# Patient Record
Sex: FEMALE | Race: WHITE | NOT HISPANIC OR LATINO | Employment: OTHER | URBAN - METROPOLITAN AREA
[De-identification: names, ages, dates, MRNs, and addresses within clinical notes are randomized per-mention and may not be internally consistent; named-entity substitution may affect disease eponyms.]

---

## 2018-12-04 ENCOUNTER — OFFICE VISIT (OUTPATIENT)
Dept: NEUROLOGY | Facility: CLINIC | Age: 58
End: 2018-12-04

## 2018-12-04 VITALS
DIASTOLIC BLOOD PRESSURE: 78 MMHG | HEART RATE: 61 BPM | HEIGHT: 65 IN | WEIGHT: 232 LBS | BODY MASS INDEX: 38.65 KG/M2 | OXYGEN SATURATION: 97 % | SYSTOLIC BLOOD PRESSURE: 108 MMHG

## 2018-12-04 DIAGNOSIS — H53.9 VISION DISTURBANCE: ICD-10-CM

## 2018-12-04 DIAGNOSIS — G43.119 INTRACTABLE MIGRAINE WITH AURA WITHOUT STATUS MIGRAINOSUS: Primary | ICD-10-CM

## 2018-12-04 DIAGNOSIS — G44.89 OTHER HEADACHE SYNDROME: ICD-10-CM

## 2018-12-04 PROCEDURE — 99204 OFFICE O/P NEW MOD 45 MIN: CPT | Performed by: NURSE PRACTITIONER

## 2018-12-04 RX ORDER — SIMVASTATIN 40 MG
1 TABLET ORAL DAILY
COMMUNITY
Start: 2018-11-20

## 2018-12-04 RX ORDER — TOPIRAMATE 100 MG/1
1 TABLET, FILM COATED ORAL DAILY
COMMUNITY
Start: 2018-10-25 | End: 2019-10-01 | Stop reason: SDUPTHER

## 2018-12-04 RX ORDER — BUTALBITAL, ACETAMINOPHEN AND CAFFEINE 50; 325; 40 MG/1; MG/1; MG/1
1 TABLET ORAL EVERY 6 HOURS PRN
Qty: 20 TABLET | Refills: 2 | Status: SHIPPED | OUTPATIENT
Start: 2018-12-04 | End: 2018-12-15

## 2018-12-04 RX ORDER — TRAZODONE HYDROCHLORIDE 150 MG/1
1 TABLET ORAL NIGHTLY
COMMUNITY
Start: 2018-11-20 | End: 2018-12-04

## 2018-12-04 RX ORDER — ASPIRIN 81 MG/1
81 TABLET ORAL DAILY
COMMUNITY

## 2018-12-04 RX ORDER — OMEPRAZOLE 20 MG/1
CAPSULE, DELAYED RELEASE ORAL
COMMUNITY
Start: 2018-09-21

## 2018-12-04 RX ORDER — LISINOPRIL 10 MG/1
1 TABLET ORAL DAILY
COMMUNITY
Start: 2018-09-13

## 2018-12-04 RX ORDER — TIZANIDINE 4 MG/1
1 TABLET ORAL 3 TIMES DAILY
COMMUNITY
Start: 2018-09-08

## 2018-12-04 RX ORDER — GABAPENTIN 100 MG/1
CAPSULE ORAL
Qty: 270 CAPSULE | Refills: 5 | Status: SHIPPED | OUTPATIENT
Start: 2018-12-04 | End: 2019-10-01 | Stop reason: SDUPTHER

## 2018-12-04 RX ORDER — GABAPENTIN 100 MG/1
100 CAPSULE ORAL 3 TIMES DAILY
Refills: 0 | COMMUNITY
Start: 2018-11-16 | End: 2018-12-04

## 2018-12-04 RX ORDER — PROPRANOLOL HYDROCHLORIDE 20 MG/1
1 TABLET ORAL DAILY
COMMUNITY
Start: 2018-10-03 | End: 2019-02-01

## 2018-12-04 RX ORDER — OLANZAPINE 7.5 MG/1
1 TABLET ORAL NIGHTLY
COMMUNITY
Start: 2018-11-16

## 2018-12-04 RX ORDER — FLUOXETINE HYDROCHLORIDE 20 MG/1
1 CAPSULE ORAL 3 TIMES DAILY
COMMUNITY
Start: 2018-11-16

## 2018-12-04 RX ORDER — MAGNESIUM OXIDE 400 MG/1
400 TABLET ORAL DAILY
COMMUNITY

## 2018-12-04 NOTE — PROGRESS NOTES
Subjective:     Patient ID: Trini Obrien is a 58 y.o. female.    CC:   Chief Complaint   Patient presents with   • Migraine       HPI:   History of Present Illness     Miss Obrien is a 58-year-old patient here today for new patient evaluation.  She reports a history of migraines lasting about 7-8 years now, possibly longer.  She has noticed an increase in her migraine intensity, frequency and duration past 7-8 months.  Recently she has been to the ER 4-5 times within one month.  She reports her headaches are described as a throbbing sensation typically of the left thigh, sometimes right eye.  She has no associated tearing or vision changes.  She does endorse nausea with headache, she also complains of photophobia and phonophobia.  She also reports occasional visual scotoma described as flashing lights.  She has been on Topamax 100 mg twice a day for about 2-3 months.  She was previously on lower dose for several years, she has not noticed much improvement in her headache with the increased dosage.  She does feel like she has identified triggers to her migraines including stress, weather changes, bright sunlight and under sleeping.  She had CT of her brain in November which was reportedly normal.  She does also complain of chronic sinus issues, she has an appointment with ENT next week.  She denies regular dizziness, tinnitus, speech changes or stroke symptoms    The following portions of the patient's history were reviewed and updated as appropriate: allergies, current medications, past family history, past medical history, past social history, past surgical history and problem list.    Past Medical History:   Diagnosis Date   • Anxiety    • Arthritis    • Bipolar disorder (CMS/HCC)    • Hypertension    • Migraine        Past Surgical History:   Procedure Laterality Date   • BACK SURGERY   and    • BILATERAL BREAST REDUCTION     •  SECTION     • HYSTERECTOMY     • KNEE SURGERY   and         Social History     Socioeconomic History   • Marital status:      Spouse name: Not on file   • Number of children: Not on file   • Years of education: Not on file   • Highest education level: Not on file   Social Needs   • Financial resource strain: Not on file   • Food insecurity - worry: Not on file   • Food insecurity - inability: Not on file   • Transportation needs - medical: Not on file   • Transportation needs - non-medical: Not on file   Occupational History   • Not on file   Tobacco Use   • Smoking status: Never Smoker   Substance and Sexual Activity   • Alcohol use: No     Frequency: Never   • Drug use: No   • Sexual activity: Not on file   Other Topics Concern   • Not on file   Social History Narrative   • Not on file       Family History   Problem Relation Age of Onset   • Diabetes Mother    • Hypertension Mother    • Heart disease Maternal Grandfather         Review of Systems   Constitutional: Positive for fatigue.   HENT: Positive for ear pain.    Eyes: Negative.    Respiratory: Negative.    Cardiovascular: Negative.    Gastrointestinal: Negative.    Endocrine: Negative.    Genitourinary: Negative.    Musculoskeletal: Positive for back pain and neck pain.   Skin: Negative.    Allergic/Immunologic: Negative.    Neurological: Positive for headaches. Negative for dizziness, tremors, seizures, syncope, facial asymmetry, speech difficulty, weakness, light-headedness and numbness.   Hematological: Negative.    Psychiatric/Behavioral: Positive for dysphoric mood.        Objective:    Neurologic Exam     Mental Status   Oriented to person, place, and time.   Attention: normal. Concentration: normal.   Speech: speech is normal   Level of consciousness: alert  Knowledge: consistent with education.   Able to read. Able to write. Normal comprehension.     Cranial Nerves   Cranial nerves II through XII intact.     CN III, IV, VI   Pupils are equal, round, and reactive to light.  Extraocular motions  are normal.     Motor Exam   Muscle bulk: normal  Overall muscle tone: normal  Right arm tone: normal  Left arm tone: normal  Right arm pronator drift: absent  Left arm pronator drift: absent  Right leg tone: normal  Left leg tone: normal    Strength   Strength 5/5 throughout.     Sensory Exam   Light touch normal.     Gait, Coordination, and Reflexes     Gait  Gait: normal    Coordination   Romberg: negative  Finger to nose coordination: normal  Heel to shin coordination: normal  Tandem walking coordination: normal    Tremor   Resting tremor: absent  Intention tremor: absent  Action tremor: absent    Reflexes   Reflexes 2+ except as noted.   Right Anguiano: absent  Left Anguiano: absent      Physical Exam   Constitutional: She is oriented to person, place, and time. She appears well-developed and well-nourished. No distress.   HENT:   Head: Normocephalic and atraumatic.   Eyes: EOM are normal. Pupils are equal, round, and reactive to light.   Neck: Normal range of motion. Neck supple.   Cardiovascular: Normal rate.   Pulmonary/Chest: Effort normal. No respiratory distress.   Neurological: She is alert and oriented to person, place, and time. She has normal strength. She has a normal Finger-Nose-Finger Test, a normal Heel to Shin Test, a normal Romberg Test and a normal Tandem Gait Test. Gait normal.   Skin: Skin is warm. Capillary refill takes less than 2 seconds.   Psychiatric: She has a normal mood and affect. Her speech is normal and behavior is normal. Judgment and thought content normal.   Vitals reviewed.      Assessment/Plan:       Trini was seen today for migraine.    Diagnoses and all orders for this visit:    Intractable migraine with aura without status migrainosus  -     gabapentin (NEURONTIN) 100 MG capsule; Take 2 PO TID    Vision disturbance  -     MRI Angiogram Head Without Contrast; Future  -     Ambulatory Referral to Ophthalmology    Other headache syndrome  -     MRI Angiogram Head Without  Contrast; Future  -     Ambulatory Referral to Ophthalmology    Other orders  -     butalbital-acetaminophen-caffeine (ESGIC) -40 MG per tablet; Take 1 tablet by mouth Every 6 (Six) Hours As Needed for Headache.    Ms. Obrein has a long-standing history of migraines, she actually saw Dr. Boston many years ago.  She's been on Topamax for quite some time, she recently had an increase in migraine frequency and intensity in the past 7-8 months.  She has some associated visual floaters, complaints of bright jagged lines at times which are consistent with scotoma.  She does have a family history of aneurysm, only to get an MRA of the brain to rule out aneurysm, recent CT scan read as normal.  We'll consider MRI of the brain after MRI is complete.  She is unable to take triptan's due to prior intolerance, I will give her prescription for butalbital to take as needed, she's instructed to use this sparingly.  She is also past due for an eye exam, referral has been placed to ophthalmology  She has not noticed a difference in her headaches with the increase in Topamax, she has however noticed some word finding difficulties and other cognitive side effects.  She has tried many medications in the past including amitriptyline.  I do not feel comfortable increasing her beta blocker given her blood pressure and heart rate today.  I am going to start her on low-dose gabapentin 100 mg twice a day for now for further migraine treatment  Reviewed medications, potential side effects and signs and symptoms to report. Discussed risk versus benefits of treatment plan with patient and/or family-including medications, labs and radiology that may be ordered. Addressed questions and concerns during visit. Patient and/or family verbalized understanding and agree with plan.  As part of this patient's treatment plan I am prescribing controlled substances. The patient has been made aware of appropriate use of such medications, including  potential risk of somnolence, limited ability to drive and/or work safely, and potential for dependence or overdose. It has also been made clear that these medications are for use by the patient only, without concomitant use of alcohol or other substances unless prescribed. Keep out of reach of children.  Tomás report has been reviewed. If this is going to be prescribed long term, [facility] [place] has also been read and signed by patient and myself.  During this visit the following were done:  Labs Reviewed []    Labs Ordered []    Radiology Reports Reviewed [x]    Radiology Ordered [x]    PCP Records Reviewed [x]    Referring Provider Records Reviewed []    ER Records Reviewed []    Hospital Records Reviewed []    History Obtained From Family []    Radiology Images Reviewed []    Other Reviewed []    Records Requested []      EMR Dragon/Transcription Disclaimer:  Much of this encounter note is an electronic transcription of spoken language to printed text. Electronic transcription of spoken language may permit erroneous words or phrases to be inadvertently transcribed. Although I have reviewed the note for such errors, some may still exist in this documentation.             Leanna Pal, APRN  12/10/2018

## 2019-01-04 ENCOUNTER — OFFICE VISIT (OUTPATIENT)
Dept: NEUROLOGY | Facility: CLINIC | Age: 59
End: 2019-01-04

## 2019-01-04 VITALS
WEIGHT: 233 LBS | BODY MASS INDEX: 38.82 KG/M2 | HEIGHT: 65 IN | HEART RATE: 74 BPM | OXYGEN SATURATION: 98 % | DIASTOLIC BLOOD PRESSURE: 86 MMHG | SYSTOLIC BLOOD PRESSURE: 158 MMHG

## 2019-01-04 DIAGNOSIS — I66.21 OCCLUSION AND STENOSIS OF RIGHT POSTERIOR CEREBRAL ARTERY: Primary | ICD-10-CM

## 2019-01-04 DIAGNOSIS — J34.9 SINUS DISEASE: ICD-10-CM

## 2019-01-04 DIAGNOSIS — G43.709 CHRONIC MIGRAINE WITHOUT AURA WITHOUT STATUS MIGRAINOSUS, NOT INTRACTABLE: ICD-10-CM

## 2019-01-04 PROCEDURE — 99213 OFFICE O/P EST LOW 20 MIN: CPT | Performed by: NURSE PRACTITIONER

## 2019-01-04 NOTE — PROGRESS NOTES
Subjective:     Patient ID: Trini Obrien is a 58 y.o. female.    CC:   Chief Complaint   Patient presents with   • Migraine       HPI:   History of Present Illness     Ms Obrien is here today for follow up on headaches.  When I first saw her she reported a history of migraines for about 7-8 years now, possibly longer.  She had noticed an increase in her migraine intensity, frequency and duration past 7-8 months.  Recently she had been to the ER 4-5 times within one month.  She reported her headaches were described as a throbbing sensation typically of the left thigh, sometimes right eye.  She reported no associated tearing or vision changes.  She does endorse nausea with headache, she also complains of photophobia and phonophobia.  She also reports occasional visual scotoma described as flashing lights.  She had been on Topamax 100 mg twice a day for about 2-3 months, she had also been on elavil and was unable to take triptans.  She was previously on lower dose for several years, she has not noticed much improvement in her headache with the increased dosage.  She does feel like she has identified triggers to her migraines including stress, weather changes, bright sunlight and under sleeping.  She had CT of her brain in November which was reportedly normal.  She does also complain of chronic sinus issues, she has an appointment with ENT next week.  She denies regular dizziness, tinnitus, speech changes or stroke symptoms    At first visit I started her on gabapentin 200 mg TID, she is taking this BID.  Headaches are a bit improved however since I saw her she did have sinus surgery.  She apparently had severe sinus disease, she is wondering if this surgery also helped her headaches.  She did have MRA in [facility], I had not received the report, while pt was here I did get report which shows occlusion right posterior cerebral artery.       The following portions of the patient's history were reviewed and updated as  appropriate: allergies, current medications, past family history, past medical history, past social history, past surgical history and problem list.    Past Medical History:   Diagnosis Date   • Anxiety    • Arthritis    • Bipolar disorder (CMS/HCC)    • Hypertension    • Migraine        Past Surgical History:   Procedure Laterality Date   • BACK SURGERY   and    • BILATERAL BREAST REDUCTION     •  SECTION     • HYSTERECTOMY     • KNEE SURGERY   and 2017       Social History     Socioeconomic History   • Marital status:      Spouse name: Not on file   • Number of children: Not on file   • Years of education: Not on file   • Highest education level: Not on file   Social Needs   • Financial resource strain: Not on file   • Food insecurity - worry: Not on file   • Food insecurity - inability: Not on file   • Transportation needs - medical: Not on file   • Transportation needs - non-medical: Not on file   Occupational History   • Not on file   Tobacco Use   • Smoking status: Never Smoker   Substance and Sexual Activity   • Alcohol use: No     Frequency: Never   • Drug use: No   • Sexual activity: Not on file   Other Topics Concern   • Not on file   Social History Narrative   • Not on file       Family History   Problem Relation Age of Onset   • Diabetes Mother    • Hypertension Mother    • Heart disease Maternal Grandfather         Review of Systems   Constitutional: Negative.    HENT:        Recent sinus surgery   Eyes: Negative.    Respiratory: Negative.    Cardiovascular: Negative.    Genitourinary: Negative.    Musculoskeletal: Negative.    Neurological: Positive for dizziness and headaches. Negative for tremors, seizures, syncope, facial asymmetry, speech difficulty, weakness and numbness.   Hematological: Negative.    Psychiatric/Behavioral: Negative.         Objective:    Neurologic Exam     Mental Status   Oriented to person, place, and time.   Attention: normal. Concentration: normal.    Speech: speech is normal   Level of consciousness: alert  Knowledge: consistent with education.   Able to read. Able to write. Normal comprehension.     Cranial Nerves   Cranial nerves II through XII intact.     CN III, IV, VI   Pupils are equal, round, and reactive to light.  Extraocular motions are normal.     Motor Exam   Muscle bulk: normal  Overall muscle tone: normal  Right arm tone: normal  Left arm tone: normal  Right arm pronator drift: absent  Left arm pronator drift: absent  Right leg tone: normal  Left leg tone: normal    Strength   Strength 5/5 throughout.     Sensory Exam   Light touch normal.     Gait, Coordination, and Reflexes     Gait  Gait: normal    Coordination   Romberg: negative  Finger to nose coordination: normal  Heel to shin coordination: normal  Tandem walking coordination: normal    Tremor   Resting tremor: absent  Intention tremor: absent  Action tremor: absent    Reflexes   Reflexes 2+ except as noted.   Right plantar: normal  Left plantar: normal  Right Anguiano: absent  Left Anguiano: absent      Physical Exam   Constitutional: She is oriented to person, place, and time. She appears well-developed and well-nourished. No distress.   HENT:   Head: Normocephalic and atraumatic.   Eyes: EOM are normal. Pupils are equal, round, and reactive to light.   Neck: Normal range of motion.   Cardiovascular: Normal rate.   Pulmonary/Chest: Effort normal.   Neurological: She is alert and oriented to person, place, and time. She has normal strength. She has a normal Finger-Nose-Finger Test, a normal Heel to Shin Test, a normal Romberg Test and a normal Tandem Gait Test. Gait normal.   Psychiatric: She has a normal mood and affect. Her speech is normal and behavior is normal. Judgment and thought content normal.   Vitals reviewed.      Assessment/Plan:       Trini was seen today for migraine.    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 Stafford for opinion  Orders:  -     Ambulatory Referral to Neurosurgery    Chronic migraine without aura without status migrainosus, not intractable  Comments:  improved with gabapentin and recent sinus surgery.  Orders:  -     MRI Brain Without Contrast; Future    Sinus disease  Comments:  s/p surgery 2 weeks ago    she is doing better with gabapentin and after sinus surgery.  I would like her to see Dr Stafford for opinion on MRA finding.  I would like to get MRI of brain, CT was negative to rule out tumor or white matter dx.  Reviewed medications, potential side effects and signs and symptoms to report. Discussed risk versus benefits of treatment plan with patient and/or family-including medications, labs and radiology that may be ordered. Addressed questions and concerns during visit. Patient and/or family verbalized understanding and agree with plan.  We reviewed possible headache triggers, stress relief techniques, and alternative treatments for headaches. The patient was in understanding and all questions were addressed. We reviewed the diagnosis and treatment plan and medication side effect profile. The patient will follow up as scheduled.  EMR Dragon/Transcription Disclaimer:  Much of this encounter note is an electronic transcription of spoken language to printed text. Electronic transcription of spoken language may permit erroneous words or phrases to be inadvertently transcribed. Although I have reviewed the note for such errors, some may still exist in this documentation.             Leanna Pal, APRN  1/9/2019

## 2019-01-08 ENCOUNTER — TELEPHONE (OUTPATIENT)
Dept: NEUROLOGY | Facility: CLINIC | Age: 59
End: 2019-01-08

## 2019-01-08 NOTE — TELEPHONE ENCOUNTER
I had seen this when she was here and referred her to neurosurgery, Dr. Stafford, for opinion. Can you check on appt, they may want that copy prior to scheduling.  She is going to bring disc with her to appt with Dr. Stafford

## 2019-01-23 ENCOUNTER — OFFICE VISIT (OUTPATIENT)
Dept: NEUROSURGERY | Facility: CLINIC | Age: 59
End: 2019-01-23

## 2019-01-23 VITALS
WEIGHT: 235 LBS | HEIGHT: 65 IN | BODY MASS INDEX: 39.15 KG/M2 | TEMPERATURE: 97.7 F | DIASTOLIC BLOOD PRESSURE: 92 MMHG | SYSTOLIC BLOOD PRESSURE: 150 MMHG

## 2019-01-23 DIAGNOSIS — E78.5 HYPERLIPIDEMIA, UNSPECIFIED HYPERLIPIDEMIA TYPE: ICD-10-CM

## 2019-01-23 DIAGNOSIS — R90.89 ABNORMAL MRA, BRAIN: Primary | ICD-10-CM

## 2019-01-23 DIAGNOSIS — I10 ESSENTIAL HYPERTENSION: ICD-10-CM

## 2019-01-23 PROCEDURE — 99204 OFFICE O/P NEW MOD 45 MIN: CPT | Performed by: PHYSICIAN ASSISTANT

## 2019-01-23 RX ORDER — TRAZODONE HYDROCHLORIDE 150 MG/1
TABLET ORAL
COMMUNITY

## 2019-01-23 RX ORDER — MELOXICAM 15 MG/1
1 TABLET ORAL DAILY
COMMUNITY

## 2019-01-23 NOTE — PROGRESS NOTES
Subjective   Patient: Trini Obrien  : 1960  Chart #: [id number]    Date of Service: 2019    Chief complaint: abnormal MR Angiogram    HPI:  Miss Obrien is a 58-year-old female with past medical history of hypertension, hyperlipidemia, SVT status post heart ablation, anxiety, migraine headaches, arthritis and bipolar disorder who presents to our clinic today as a new patient for evaluation of an abnormal MR angiogram.  The MR angiogram was ordered by her primary care provider, Fiorella GIL for workup of migraine headaches.  The MRA was significant for an opacified right maxillary sinus for which she has undergone surgery with subsequent resolution of her migraines.  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     The patient denies heart attack, previous stroke or TIA symptoms, and diabetes.  She stopped smoking cigarettes approximately 10 years ago.  She takes a daily baby aspirin.  She denies stroke like symptoms including unilateral weakness, numbness or paresthesia, changes in swallowing, vision or speech, and changes in balance and coordination.     Past Medical History:   Diagnosis Date   • Anxiety    • Arthritis    • Bipolar disorder (CMS/HCC)    • Hypertension    • Migraine        Allergies   Allergen Reactions   • Antivert [Meclizine] Mental Status Change   • Imitrex [Sumatriptan] Hives   • Penicillins Rash         Current Outpatient Medications:   •  aspirin 81 MG EC tablet, Take 81 mg by mouth Daily., Disp: , Rfl:   •  Cholecalciferol (VITAMIN D3) 5000 units capsule capsule, Take 5,000 Units by mouth Daily., Disp: , Rfl:   •  FLUoxetine (PROzac) 20 MG capsule, Take 1 capsule by mouth 3 (Three) Times a Day., Disp: , Rfl:   •  gabapentin (NEURONTIN) 100 MG capsule, Take 2 PO TID, Disp: 270 capsule, Rfl: 5  •  lisinopril (PRINIVIL,ZESTRIL) 10 MG tablet, Take 1 tablet by mouth Daily., Disp: , Rfl:   •  magnesium  oxide (MAG-OX) 400 MG tablet, Take 400 mg by mouth Daily., Disp: , Rfl:   •  OLANZapine (zyPREXA) 7.5 MG tablet, Take 1 tablet by mouth Every Night., Disp: , Rfl:   •  omeprazole (priLOSEC) 20 MG capsule, , Disp: , Rfl:   •  propranolol (INDERAL) 20 MG tablet, Take 1 tablet by mouth Daily., Disp: , Rfl:   •  simvastatin (ZOCOR) 40 MG tablet, Take 1 tablet by mouth Daily., Disp: , Rfl:   •  tiZANidine (ZANAFLEX) 4 MG tablet, Take 1 tablet by mouth 3 (Three) Times a Day., Disp: , Rfl:   •  topiramate (TOPAMAX) 100 MG tablet, Take 1 tablet by mouth 2 (Two) Times a Day., Disp: , Rfl:   •  meloxicam (MOBIC) 15 MG tablet, Take 1 tablet by mouth Daily., Disp: , Rfl:   •  traZODone (DESYREL) 150 MG tablet, trazodone 150 mg tablet, Disp: , Rfl:     Social History     Socioeconomic History   • Marital status:      Spouse name: Not on file   • Number of children: Not on file   • Years of education: Not on file   • Highest education level: Not on file   Tobacco Use   • Smoking status: Never Smoker   • Smokeless tobacco: Never Used   Substance and Sexual Activity   • Alcohol use: No     Frequency: Never   • Drug use: No   • Sexual activity: Defer       Family History   Problem Relation Age of Onset   • Diabetes Mother    • Hypertension Mother    • Heart disease Maternal Grandfather        Review of Systems   Constitutional: Positive for fatigue. Negative for activity change, appetite change, chills, diaphoresis, fever and unexpected weight change.   HENT: Negative for congestion, dental problem, drooling, ear discharge, ear pain, facial swelling, hearing loss, mouth sores, nosebleeds, postnasal drip, rhinorrhea, sinus pressure, sneezing, sore throat, tinnitus, trouble swallowing and voice change.    Eyes: Negative for photophobia, pain, discharge, redness, itching and visual disturbance.   Respiratory: Negative for apnea, cough, choking, chest tightness, shortness of breath, wheezing and stridor.    Cardiovascular:  "Negative for chest pain, palpitations and leg swelling.   Gastrointestinal: Negative for abdominal distention, abdominal pain, anal bleeding, blood in stool, constipation, diarrhea, nausea, rectal pain and vomiting.   Endocrine: Positive for cold intolerance. Negative for heat intolerance, polydipsia, polyphagia and polyuria.   Genitourinary: Negative for decreased urine volume, difficulty urinating, dysuria, enuresis, flank pain, frequency, genital sores, hematuria and urgency.   Musculoskeletal: Positive for arthralgias, back pain and neck pain. Negative for gait problem, joint swelling, myalgias and neck stiffness.   Skin: Negative for color change, pallor, rash and wound.   Allergic/Immunologic: Negative for environmental allergies, food allergies and immunocompromised state.   Neurological: Positive for headaches. Negative for dizziness, tremors, seizures, syncope, facial asymmetry, speech difficulty, weakness, light-headedness and numbness.   Hematological: Negative for adenopathy. Does not bruise/bleed easily.   Psychiatric/Behavioral: Positive for dysphoric mood. Negative for agitation, behavioral problems, confusion, hallucinations, self-injury, sleep disturbance and suicidal ideas. The patient is not nervous/anxious and is not hyperactive.          Physical examination:  Blood pressure 150/92, temperature 97.7 °F (36.5 °C), temperature source Temporal, height 165.1 cm (65\"), weight 107 kg (235 lb).  Physical Exam   Constitutional: She appears well-developed and well-nourished.   Cardiovascular: Normal rate, regular rhythm and normal heart sounds.   Pulmonary/Chest: Effort normal and breath sounds normal.       Neurological Exam    Mental Status  Oriented to person, place and time  Attention: normal  Level of consciousness: alert  Knowledge: adequate fund of knowledge    Speech  Speech: speech is normal without dysarthria or aphasia    Cranial Nerves  Cranial nerves II through XII intact    Motor " Exam  Strength 5/5 bilateral upper and lower extremities  Normal tone bilateral upper and lower extremities  No pronator drift of the upper extremities    Sensory Exam  Light touch normal    Gait, Coordination and Reflexes  Normal gait  Normal finger to nose coordination  Normal heel to shin  Reflexes unremarkable    No carotid bruits bilaterally      Radiographic Imaging:  MR Angiogram images dated 12/26/2018 were reviewed by and with Dr. Stafford along with the corresponding radiologic report. There were no prior images available for comparison. There was a questionable right distal PCA stenosis which could be artifactual.  He did not appreciate any proximal stenosis that would benefit from neuro intervention    Medical Decision Making:     This Weirton is a 58-year-old female with history of hypertension, hyperlipidemia, migraines headaches and previous tobacco dependence.  She presented to clinic today for interpretation and evaluation of abnormal MR angiogram performed in December 2018 which raised suspicion for right posterior cerebral artery and right middle cerebral artery stenoses.  The MRA was ordered during workup of migraine headaches, which subsequently resolved after sinus surgery.  She did not report any stroke-like symptoms.  Dr. Stafford reviewed her imaging and feels the PCA stenosis may be artifact and did not appreciate any proximal stenosis which would benefit from neuro intervention.  She is already modifying her cardiovascular risk factors by taking aspirin 81 mg daily, pravastatin for her hyperlipidemia and antihypertensives to control her blood pressure.  She no longer smokes.  At this time, Dr. Stafford does not feel neuro intervention is necessary and would recommend that she follow up on an as-needed basis.  She was instructed to call 911 immediately the onset of any strokelike symptoms including sudden unilateral weakness, paresthesias, changes in vision or speech or balance and  coordination.    I spent 45 minutes performing a face-to-face diagnostic evaluation, neurosurgical decision making to assess and support the plan of care, risks and benefits of treatment, options and coordination of care with the patient.    Cristina Dillon PA-C

## 2019-02-01 ENCOUNTER — OFFICE VISIT (OUTPATIENT)
Dept: NEUROLOGY | Facility: CLINIC | Age: 59
End: 2019-02-01

## 2019-02-01 VITALS
DIASTOLIC BLOOD PRESSURE: 82 MMHG | SYSTOLIC BLOOD PRESSURE: 138 MMHG | HEIGHT: 65 IN | WEIGHT: 234 LBS | BODY MASS INDEX: 38.99 KG/M2 | HEART RATE: 84 BPM | OXYGEN SATURATION: 97 %

## 2019-02-01 DIAGNOSIS — I10 ESSENTIAL HYPERTENSION: ICD-10-CM

## 2019-02-01 DIAGNOSIS — G43.709 CHRONIC MIGRAINE WITHOUT AURA WITHOUT STATUS MIGRAINOSUS, NOT INTRACTABLE: Primary | ICD-10-CM

## 2019-02-01 PROCEDURE — 99213 OFFICE O/P EST LOW 20 MIN: CPT | Performed by: NURSE PRACTITIONER

## 2019-02-01 RX ORDER — PROPRANOLOL HYDROCHLORIDE 20 MG/1
20 TABLET ORAL 3 TIMES DAILY
Qty: 90 TABLET | Refills: 3 | Status: CANCELLED | OUTPATIENT
Start: 2019-02-01

## 2019-02-01 NOTE — PROGRESS NOTES
Subjective:     Patient ID: Trini Obrien is a 58 y.o. female.    CC:   Chief Complaint   Patient presents with   • Migraine       HPI:   History of Present Illness     Ms Obrien is here today for follow up on headaches.  When I first saw her she reported a history of migraines for about 7-8 years now, possibly longer.  She had noticed an increase in her migraine intensity, frequency and duration past 7-8 months.  Recently she had been to the ER 4-5 times within one month.  She reported her headaches were described as a throbbing sensation typically of the left thigh, sometimes right eye.  She reported no associated tearing or vision changes.  She does endorse nausea with headache, she also complains of photophobia and phonophobia.  She also reports occasional visual scotoma described as flashing lights.  She had been on Topamax 100 mg twice a day for about 2-3 months, she had also been on elavil and was unable to take triptans.  She was previously on lower dose for several years, she has not noticed much improvement in her headache with the increased dosage.  She does feel like she has identified triggers to her migraines including stress, weather changes, bright sunlight and under sleeping.  She had CT of her brain in November which was reportedly normal.  She does also complain of chronic sinus issues, she has an appointment with ENT next week.  She denies regular dizziness, tinnitus, speech changes or stroke symptoms     At first visit I started her on gabapentin 200 mg TID, she is taking this BID.  Headaches are a bit improved however since I saw her she did have sinus surgery.  She apparently had severe sinus disease, she is wondering if this surgery also helped her headaches.  She did have MRA in [facility], I had not received the report, while pt was here I did get report which shows occlusion right posterior cerebral artery, she has since seen Dr. Stafford who felt this was nonsurgical.    Overall she is  doing well, she has had a few migraines since I saw her last but not as intense or frequent as prior to sinus surgery.  Her BP has been running high in 150-160s/70-90s.  She is taking propranolol 20 mg in am only.    The following portions of the patient's history were reviewed and updated as appropriate: allergies, current medications, past family history, past medical history, past social history, past surgical history and problem list.    Past Medical History:   Diagnosis Date   • Anxiety    • Arthritis    • Bipolar disorder (CMS/HCC)    • HLD (hyperlipidemia) 2019   • Hypertension    • Migraine        Past Surgical History:   Procedure Laterality Date   • BACK SURGERY   and    • BILATERAL BREAST REDUCTION     •  SECTION     • HYSTERECTOMY     • KNEE SURGERY   and        Social History     Socioeconomic History   • Marital status:      Spouse name: Not on file   • Number of children: Not on file   • Years of education: Not on file   • Highest education level: Not on file   Social Needs   • Financial resource strain: Not on file   • Food insecurity - worry: Not on file   • Food insecurity - inability: Not on file   • Transportation needs - medical: Not on file   • Transportation needs - non-medical: Not on file   Occupational History   • Not on file   Tobacco Use   • Smoking status: Never Smoker   • Smokeless tobacco: Never Used   Substance and Sexual Activity   • Alcohol use: No     Frequency: Never   • Drug use: No   • Sexual activity: Defer   Other Topics Concern   • Not on file   Social History Narrative   • Not on file       Family History   Problem Relation Age of Onset   • Diabetes Mother    • Hypertension Mother    • Heart disease Maternal Grandfather         Review of Systems   Constitutional: Negative.    Eyes: Negative.    Respiratory: Negative.    Cardiovascular: Negative.    Gastrointestinal: Negative.    Endocrine: Negative.    Genitourinary: Negative.     Musculoskeletal: Negative.    Allergic/Immunologic: Negative.    Neurological: Positive for headaches. Negative for dizziness, tremors, seizures, syncope, facial asymmetry, speech difficulty, weakness, light-headedness and numbness.   Hematological: Negative.    Psychiatric/Behavioral: Negative.         Objective:    Neurologic Exam     Mental Status   Oriented to person, place, and time.   Attention: normal. Concentration: normal.   Speech: speech is normal   Level of consciousness: alert  Knowledge: consistent with education.   Able to read. Able to write. Normal comprehension.     Cranial Nerves   Cranial nerves II through XII intact.     CN III, IV, VI   Pupils are equal, round, and reactive to light.  Extraocular motions are normal.     Motor Exam   Muscle bulk: normal  Overall muscle tone: normal    Strength   Strength 5/5 throughout.     Sensory Exam   Light touch normal.     Gait, Coordination, and Reflexes     Gait  Gait: normal    Coordination   Romberg: negative  Finger to nose coordination: normal    Tremor   Resting tremor: absent  Intention tremor: absent  Action tremor: absent    Reflexes   Reflexes 2+ except as noted.       Physical Exam   Constitutional: She is oriented to person, place, and time. She appears well-developed and well-nourished. No distress.   HENT:   Head: Normocephalic and atraumatic.   Eyes: EOM are normal. Pupils are equal, round, and reactive to light.   Cardiovascular: Normal rate.   Pulmonary/Chest: Effort normal.   Neurological: She is alert and oriented to person, place, and time. She has normal strength. She has a normal Finger-Nose-Finger Test and a normal Romberg Test. Gait normal.   Psychiatric: She has a normal mood and affect. Her speech is normal and behavior is normal. Judgment and thought content normal.   Vitals reviewed.      Assessment/Plan:       Trini was seen today for migraine.    Diagnoses and all orders for this visit:    Chronic migraine without aura  without status migrainosus, not intractable  Comments:  increase propranolol to 20 mg BID, may go to TID if needed    Essential hypertension  Comments:  increase propranolol    Other orders  -     propranolol (INDERAL) 20 MG tablet; Take 1 tablet by mouth 3 (Three) Times a Day.    Overall Mrs. Obrien is improved, she has had a few headaches since I saw her last where as they had improved after sinus surgery initially.  She is only taking a very small dose of propranolol 20 mg once daily, we've discussed that this is a very short acting medication and she would benefit more from a twice a day to 3 times a day dosing especially given her elevated blood pressures.  She would like to increase to twice a day for now, she may add an afternoon dose if needed.  If she does get up to a higher dose we can change this to an extended release form once daily.  She is to call me a report in a few weeks on her blood pressures and headaches and we can make medication changes over the phone.  She again declines MRI of the brain, she had a negative CT prior to seeing me.  MRA was performed showing some stenosis, she saw Dr. salinas who recommended no surgical intervention which she is very happy with.  I would  like to see her back in about 3-4 months or sooner if needed, she'll call me if she has any questions or concerns prior to follow-up appointment  Reviewed medications, potential side effects and signs and symptoms to report. Discussed risk versus benefits of treatment plan with patient and/or family-including medications, labs and radiology that may be ordered. Addressed questions and concerns during visit. Patient and/or family verbalized understanding and agree with plan.  EMR Dragon/Transcription Disclaimer:  Much of this encounter note is an electronic transcription of spoken language to printed text. Electronic transcription of spoken language may permit erroneous words or phrases to be inadvertently transcribed. Although  I have reviewed the note for such errors, some may still exist in this documentation.           Leanna Pal, APRN  2/6/2019

## 2019-02-04 ENCOUNTER — TELEPHONE (OUTPATIENT)
Dept: NEUROLOGY | Facility: CLINIC | Age: 59
End: 2019-02-04

## 2019-02-04 NOTE — TELEPHONE ENCOUNTER
Pt called and said Gwendolyn was suppose to send in a new script for an increase in her propranolol but pharmacy said they do not have it so I placed her on hold and called Gwendolyn and she said to tell pt to take two of the propranolol she has and she would send in the increase to pharmacy.  I let pt know response

## 2019-02-05 RX ORDER — PROPRANOLOL HYDROCHLORIDE 20 MG/1
20 TABLET ORAL 2 TIMES DAILY
Qty: 60 TABLET | Refills: 5 | Status: SHIPPED | OUTPATIENT
Start: 2019-02-05 | End: 2019-02-20

## 2019-02-19 ENCOUNTER — TELEPHONE (OUTPATIENT)
Dept: NEUROLOGY | Facility: CLINIC | Age: 59
End: 2019-02-19

## 2019-02-19 NOTE — TELEPHONE ENCOUNTER
Pt is calling to verify how she is supposed to take Propranolol.  She states that she was taking Propranolol 20 mg 2 po qam and 1 po qhs and she thought you were increasing to 2  po BID.    The new prescription was sent in for BID.

## 2019-02-20 RX ORDER — PROPRANOLOL HYDROCHLORIDE 20 MG/1
TABLET ORAL
Qty: 360 TABLET | Refills: 1 | Status: SHIPPED | OUTPATIENT
Start: 2019-02-20

## 2019-02-20 RX ORDER — PROPRANOLOL HYDROCHLORIDE 20 MG/1
TABLET ORAL
Qty: 180 TABLET | Refills: 1 | Status: SHIPPED | OUTPATIENT
Start: 2019-02-20 | End: 2019-02-20 | Stop reason: SDUPTHER

## 2019-03-27 ENCOUNTER — OFFICE VISIT (OUTPATIENT)
Dept: NEUROLOGY | Facility: CLINIC | Age: 59
End: 2019-03-27

## 2019-03-27 VITALS
HEIGHT: 65 IN | SYSTOLIC BLOOD PRESSURE: 138 MMHG | WEIGHT: 241 LBS | HEART RATE: 66 BPM | OXYGEN SATURATION: 96 % | DIASTOLIC BLOOD PRESSURE: 88 MMHG | BODY MASS INDEX: 40.15 KG/M2

## 2019-03-27 DIAGNOSIS — G43.709 CHRONIC MIGRAINE WITHOUT AURA WITHOUT STATUS MIGRAINOSUS, NOT INTRACTABLE: Primary | ICD-10-CM

## 2019-03-27 PROCEDURE — 99212 OFFICE O/P EST SF 10 MIN: CPT | Performed by: NURSE PRACTITIONER

## 2019-06-20 ENCOUNTER — TELEPHONE (OUTPATIENT)
Dept: NEUROLOGY | Facility: CLINIC | Age: 59
End: 2019-06-20

## 2019-06-20 NOTE — TELEPHONE ENCOUNTER
----- Message from Stella Flores sent at 6/20/2019  8:58 AM EDT -----  Regarding: CHRISTIAN WARREN  PATIENT CALLED, AND NEEDS A REFILL ON HER GABAPENTIN.  I VERIFIED THE PHONE, AND SHE USES [facility] IN [place], [place].

## 2019-06-20 NOTE — TELEPHONE ENCOUNTER
Pt was last seen in 2019.  She was given Gabapentin in December with five refills.  Tomás is attached.  The pharmacy said she has two in file but  due to 6 month period.

## 2019-10-01 ENCOUNTER — OFFICE VISIT (OUTPATIENT)
Dept: NEUROLOGY | Facility: CLINIC | Age: 59
End: 2019-10-01

## 2019-10-01 VITALS
WEIGHT: 241 LBS | HEIGHT: 65 IN | HEART RATE: 67 BPM | OXYGEN SATURATION: 98 % | SYSTOLIC BLOOD PRESSURE: 118 MMHG | DIASTOLIC BLOOD PRESSURE: 70 MMHG | BODY MASS INDEX: 40.15 KG/M2

## 2019-10-01 DIAGNOSIS — G43.709 CHRONIC MIGRAINE WITHOUT AURA WITHOUT STATUS MIGRAINOSUS, NOT INTRACTABLE: Primary | ICD-10-CM

## 2019-10-01 DIAGNOSIS — G43.119 INTRACTABLE MIGRAINE WITH AURA WITHOUT STATUS MIGRAINOSUS: ICD-10-CM

## 2019-10-01 PROCEDURE — 99214 OFFICE O/P EST MOD 30 MIN: CPT | Performed by: NURSE PRACTITIONER

## 2019-10-01 RX ORDER — GABAPENTIN 100 MG/1
CAPSULE ORAL
Qty: 180 CAPSULE | Refills: 5 | Status: SHIPPED | OUTPATIENT
Start: 2019-10-01

## 2019-10-01 RX ORDER — TOPIRAMATE 100 MG/1
100 TABLET, FILM COATED ORAL DAILY
Qty: 90 TABLET | Refills: 1 | Status: SHIPPED | OUTPATIENT
Start: 2019-10-01

## 2019-10-01 NOTE — PROGRESS NOTES
Subjective:     Patient ID: Trini Obrien is a 59 y.o. female.    CC:   Chief Complaint   Patient presents with   • Migraine       HPI:   History of Present Illness     Ms Obrien is here today for follow up.  I have been Seeing her for her migraine headaches since last year.  When she first came to me she reported a long history of migraines, at that time she was on Topamax 200 mg.  Her migraines had intensified in the months prior to seeing me.  She had also been put on short acting propranolol 20 mg once a day by PCP.  During workup MRI of the brain and MRA ordered, she did Dr. salinas due to some stenosis noted on MRI which she thought was insignificant.  She did not have MRI done and previously declined (had negative CT head before we saw her).  On our second meeting she reported to me that her headaches did improve after she had sinus surgery. Eventually she was found to be bit hypertensive so I increased her propranolol to a more effective dose at 20 mg 3 times daily for both blood pressure and headache prevention.  This worked well for headaches and she self titrated herself off her topamax.     She tells me today about a month or so after I saw her last in March she had good completely off of her Topamax and her headaches returned.  I had also started her on gabapentin 200 mg 3 times a day which she thought was helping her very well.  She has now restarted her Topamax and is back up to 100 mg and has been for at least a month.  She continues to have some degree of a mild headache almost every day, she is having about 4-5 migraines per month at this point.  She is had an eye exam and everything was unremarkable.  She denies dizziness, paresthesias.    The following portions of the patient's history were reviewed and updated as appropriate: allergies, current medications, past family history, past medical history, past social history, past surgical history and problem list.    Past Medical History:   Diagnosis  Date   • Anxiety    • Arthritis    • Bipolar disorder (CMS/HCC)    • HLD (hyperlipidemia) 2019   • Hypertension    • Migraine        Past Surgical History:   Procedure Laterality Date   • BACK SURGERY   and    • BILATERAL BREAST REDUCTION     •  SECTION     • HYSTERECTOMY     • KNEE SURGERY   and        Social History     Socioeconomic History   • Marital status:      Spouse name: Not on file   • Number of children: Not on file   • Years of education: Not on file   • Highest education level: Not on file   Tobacco Use   • Smoking status: Never Smoker   • Smokeless tobacco: Never Used   Substance and Sexual Activity   • Alcohol use: No     Frequency: Never   • Drug use: No   • Sexual activity: Defer       Family History   Problem Relation Age of Onset   • Diabetes Mother    • Hypertension Mother    • Heart disease Maternal Grandfather         Review of Systems   Constitutional: Negative.    HENT: Negative for tinnitus, trouble swallowing and voice change.    Eyes: Negative.    Respiratory: Negative.    Cardiovascular: Negative.    Gastrointestinal: Negative.    Endocrine: Negative.    Genitourinary: Negative.    Musculoskeletal: Negative.    Skin: Negative.    Allergic/Immunologic: Negative.    Neurological: Positive for headaches. Negative for dizziness, tremors, seizures, syncope, facial asymmetry, speech difficulty, weakness, light-headedness and numbness.   Hematological: Negative.    Psychiatric/Behavioral: Negative.         Objective:    Neurologic Exam     Mental Status   Oriented to person, place, and time.   Attention: normal. Concentration: normal.   Speech: speech is normal   Level of consciousness: alert  Knowledge: consistent with education.   Able to read. Able to write. Normal comprehension.     Cranial Nerves   Cranial nerves II through XII intact.     CN II   Visual fields full to confrontation.   Right visual field deficit: none  Left visual field deficit: none      CN III, IV, VI   Pupils are equal, round, and reactive to light.  Extraocular motions are normal.   Right pupil: Shape: regular. Reactivity: brisk. Consensual response: intact. Accommodation: intact.   Left pupil: Shape: regular. Reactivity: brisk. Consensual response: intact. Accommodation: intact.   CN III: no CN III palsy  CN VI: no CN VI palsy  Nystagmus: none   Upgaze: normal  Downgaze: normal    CN V   Facial sensation intact.     CN VII   Facial expression full, symmetric.     CN VIII   CN VIII normal.     CN IX, X   CN IX normal.   CN X normal.     CN XI   CN XI normal.     CN XII   CN XII normal.     Motor Exam   Muscle bulk: normal  Overall muscle tone: normal  Right arm tone: normal  Left arm tone: normal  Right arm pronator drift: absent  Left arm pronator drift: absent  Right leg tone: normal  Left leg tone: normal    Strength   Strength 5/5 throughout.     Sensory Exam   Light touch normal.     Gait, Coordination, and Reflexes     Gait  Gait: normal    Coordination   Romberg: negative  Finger to nose coordination: normal  Heel to shin coordination: normal  Tandem walking coordination: normal    Tremor   Resting tremor: absent  Intention tremor: absent  Action tremor: absent    Reflexes   Reflexes 2+ except as noted.   Right plantar: normal  Left plantar: normal  Right Anguiano: absent  Left Anguiano: absent      Physical Exam   Constitutional: She is oriented to person, place, and time. She appears well-developed and well-nourished.   HENT:   Head: Normocephalic and atraumatic.   Eyes: Conjunctivae and EOM are normal. Pupils are equal, round, and reactive to light. No scleral icterus.   Neck: Normal range of motion. Neck supple.   Pulmonary/Chest: Effort normal. No respiratory distress.   Neurological: She is alert and oriented to person, place, and time. She has normal strength. She has a normal Finger-Nose-Finger Test, a normal Heel to Shin Test, a normal Romberg Test and a normal Tandem Gait Test.  Gait normal.   Skin: Skin is warm. Capillary refill takes less than 2 seconds.   Psychiatric: She has a normal mood and affect. Her speech is normal and behavior is normal. Judgment and thought content normal.   Vitals reviewed.      Assessment/Plan:       Trini was seen today for migraine.    Diagnoses and all orders for this visit:    Chronic migraine without aura without status migrainosus, not intractable  -     MRI Brain Without Contrast; Future    Intractable migraine with aura without status migrainosus  -     gabapentin (NEURONTIN) 100 MG capsule; Take 2 PO TID    Other orders  -     topiramate (TOPAMAX) 100 MG tablet; Take 1 tablet by mouth Daily.    Ms. Obrien is having continued headaches, she is back on Topamax, she is currently on Topamax 100 mg Inderal 20 mg 3 times daily and gabapentin 200 mg 3 times a day.  I have previously ordered an MRI of the brain (which she canceled), I do recommend that she move forward with this testing to rule out tumor/lesion and she is agreeable.  CT of the brain prior to see me the first visit was unremarkable, MRAessentially unremarkable as well.  She has a long history of migraines.  She is interested in Botox  This patient experiences 15 /30 headache days per month with at least 5 days being severe migraine headaches. Migraine headaches last >4hour/day and have been present for greater than 6 consecutive months. Characteristics of migraines include at least 2 of the following:  moderate to severe intensity, photophobia, phonophobia, nausea and/or vomiting. Patient has tried and failed medications for migraine prevention for 3 months or greater: Examples:Topamax, Amitriptyline Propranolol, gabapentin, has allergy to triptans and insurance will not cover fioricet.   I have recommended Botox using FDA approved protocol for chronic migraine prevention resistant to prior regimens as listed above. We have discussed risk and benefits of this procedure and common side effects  including headache, neck pain, neck stiffness or weakness, ptosis, flu-like symptoms as well as more serious possible adverse effects including possible dysphagia, respiratory distress or even death (death has only been reported once with adults for Botox for migraines in another state when mixed with lidocaine solution which we do not use lidocaine solution in our practice for mixing Botox). Verbalizes understanding, accepts risks and agrees with moving forward with Botox injections for chronic migraine prevention.  Reviewed medications, potential side effects and signs and symptoms to report. Discussed risk versus benefits of treatment plan with patient and/or family-including medications, labs and radiology that may be ordered. Addressed questions and concerns during visit. Patient and/or family verbalized understanding and agree with plan.  Reviewed medications, potential side effects and signs and symptoms to report. Discussed risk versus benefits of treatment plan with patient and/or family-including medications, labs and radiology that may be ordered. Addressed questions and concerns during visit. Patient and/or family verbalized understanding and agree with plan.  Reviewed medications, potential side effects and signs and symptoms to report. Discussed risk versus benefits of treatment plan with patient and/or family-including medications, labs and radiology that may be ordered. Addressed questions and concerns during visit. Patient and/or family verbalized understanding and agree with plan.  We reviewed possible headache triggers, stress relief techniques, and alternative treatments for headaches. The patient was in understanding and all questions were addressed. We reviewed the diagnosis and treatment plan and medication side effect profile. The patient will follow up as scheduled.  EMR Dragon/Transcription Disclaimer:  Much of this encounter note is an electronic transcription of spoken language to printed  text. Electronic transcription of spoken language may permit erroneous words or phrases to be inadvertently transcribed. Although I have reviewed the note for such errors, some may still exist in this documentation.             Leanna Pal, APRN  10/1/2019

## 2019-10-29 ENCOUNTER — TELEPHONE (OUTPATIENT)
Dept: NEUROLOGY | Facility: CLINIC | Age: 59
End: 2019-10-29

## 2019-10-29 NOTE — TELEPHONE ENCOUNTER
----- Message from Evie Lemon MA sent at 10/29/2019  1:37 PM EDT -----  CALLED [facility] AND WAS TOLD THAT THE PATIENT DIDN'T WANT TO FILL THE PRESCRIPTION BECAUSE THEY DIDN'T HAVE ASSISTANCE FOR HER AND HER COPAY .23.

## 2019-11-05 ENCOUNTER — OFFICE VISIT (OUTPATIENT)
Dept: NEUROLOGY | Facility: CLINIC | Age: 59
End: 2019-11-05

## 2019-11-05 ENCOUNTER — TELEPHONE (OUTPATIENT)
Dept: NEUROLOGY | Facility: CLINIC | Age: 59
End: 2019-11-05

## 2019-11-05 VITALS
OXYGEN SATURATION: 96 % | HEIGHT: 65 IN | DIASTOLIC BLOOD PRESSURE: 80 MMHG | SYSTOLIC BLOOD PRESSURE: 110 MMHG | WEIGHT: 244 LBS | HEART RATE: 69 BPM | BODY MASS INDEX: 40.65 KG/M2

## 2019-11-05 DIAGNOSIS — G43.709 CHRONIC MIGRAINE WITHOUT AURA WITHOUT STATUS MIGRAINOSUS, NOT INTRACTABLE: Primary | ICD-10-CM

## 2019-11-05 PROCEDURE — 99214 OFFICE O/P EST MOD 30 MIN: CPT | Performed by: NURSE PRACTITIONER

## 2019-11-05 NOTE — TELEPHONE ENCOUNTER
Patient wanted to make sure you send her new dose of Propranolol to [facility].

## 2019-11-05 NOTE — TELEPHONE ENCOUNTER
Please call and verify, she told me she was taking 20 mg BID but in her chart it says 20 mg 2 PO BID

## 2019-11-05 NOTE — PROGRESS NOTES
Subjective:     Patient ID: Trini Obrien is a 59 y.o. female.    CC:   Chief Complaint   Patient presents with   • Migraine       HPI:   History of Present Illness     Ms Obrien is here today for follow up.  I have been Seeing her for her migraine headaches since last year.  When she first came to me she reported a long history of migraines, at that time she was on Topamax 200 mg.  Her migraines had intensified in the months prior to seeing me.  She had also been put on short acting propranolol 20 mg once a day by PCP.  During workup MRI of the brain and MRA ordered, she did see Dr. salinas due to some stenosis noted on MRA which he thought was insignificant.  She did not have MRI done and previously declined (had negative CT head before we saw her).  On our second meeting she reported to me that her headaches did improve after she had sinus surgery. Eventually she was found to be bit hypertensive so I increased her propranolol to a more effective dose at 20 mg 3 times daily for both blood pressure and headache prevention.  This worked well for headaches and she self titrated herself off her topamax.     She told me last visit that about a month or so after I saw her last in March she had been doing good and was completely off of her Topamax and her headaches returned.  I had also started her on gabapentin 200 mg 3 times a day which she thought was helping her very well.  She has now restarted her Topamax and is back up to 100 mg and has been for at least a month.  She continues to have some degree of a mild headache almost every day, she is having about 4-5 migraines per month at this point.  She is had an eye exam and everything was unremarkable.  She denies dizziness, paresthesias.  Last visit I did again order MRI brain (pt previously canceled as CT was done and she felt significantly better after sinus surgery)  Recent MRI brain read as minimal white matter changes with severe left maxillary sinus disease with  complete opacification and expanded sinus  She recently went to [facility] and is now on antibiotic, she thinks she needs to see ENT again  Also at last visit we discussed botox as option for migraines, unfortunately we did try to get authorization, her insurance required a $270 co-pay which she felt was too expensive.    The following portions of the patient's history were reviewed and updated as appropriate: allergies, current medications, past family history, past medical history, past social history, past surgical history and problem list.    Past Medical History:   Diagnosis Date   • Anxiety    • Arthritis    • Bipolar disorder (CMS/HCC)    • HLD (hyperlipidemia) 2019   • Hypertension    • Migraine        Past Surgical History:   Procedure Laterality Date   • BACK SURGERY   and    • BILATERAL BREAST REDUCTION     •  SECTION     • HYSTERECTOMY     • KNEE SURGERY   and        Social History     Socioeconomic History   • Marital status:      Spouse name: Not on file   • Number of children: Not on file   • Years of education: Not on file   • Highest education level: Not on file   Tobacco Use   • Smoking status: Never Smoker   • Smokeless tobacco: Never Used   Substance and Sexual Activity   • Alcohol use: No     Frequency: Never   • Drug use: No   • Sexual activity: Defer       Family History   Problem Relation Age of Onset   • Diabetes Mother    • Hypertension Mother    • Heart disease Maternal Grandfather         Review of Systems   Constitutional: Negative.    HENT: Positive for congestion, ear pain, sinus pressure and sinus pain. Negative for dental problem, hearing loss, tinnitus, trouble swallowing and voice change.    Eyes: Negative.    Respiratory: Negative.    Cardiovascular: Negative.    Gastrointestinal: Negative.    Endocrine: Negative.    Genitourinary: Negative.    Musculoskeletal: Negative.    Skin: Negative.    Allergic/Immunologic: Negative.    Neurological: Positive  for headaches. Negative for dizziness, tremors, seizures, syncope, facial asymmetry, speech difficulty, weakness, light-headedness and numbness.   Hematological: Negative.    Psychiatric/Behavioral: Negative.         Objective:    Neurologic Exam     Mental Status   Oriented to person, place, and time.   Attention: normal. Concentration: normal.   Speech: speech is normal   Level of consciousness: alert    Cranial Nerves   Cranial nerves II through XII intact.     CN III, IV, VI   Pupils are equal, round, and reactive to light.  Extraocular motions are normal.     Motor Exam   Muscle bulk: normal  Right arm pronator drift: absent  Left arm pronator drift: absent    Strength   Strength 5/5 throughout.     Gait, Coordination, and Reflexes     Gait  Gait: normal    Coordination   Finger to nose coordination: normal    Tremor   Resting tremor: absent  Intention tremor: absent  Action tremor: absent    Reflexes   Right biceps: 1+  Left biceps: 1+  Right triceps: 1+  Left triceps: 1+  Right patellar: 1+  Left patellar: 1+  Right achilles: 1+  Left achilles: 1+      Physical Exam   Constitutional: She is oriented to person, place, and time. She appears well-developed and well-nourished. No distress.   HENT:   Head: Normocephalic and atraumatic.   Eyes: Conjunctivae and EOM are normal. Pupils are equal, round, and reactive to light.   Neck: Normal range of motion. Neck supple.   Cardiovascular: Normal rate.   Pulmonary/Chest: Effort normal.   Neurological: She is alert and oriented to person, place, and time. She has normal strength. She has a normal Finger-Nose-Finger Test. Gait normal.   Reflex Scores:       Tricep reflexes are 1+ on the right side and 1+ on the left side.       Bicep reflexes are 1+ on the right side and 1+ on the left side.       Patellar reflexes are 1+ on the right side and 1+ on the left side.       Achilles reflexes are 1+ on the right side and 1+ on the left side.  Psychiatric: She has a normal  mood and affect. Her speech is normal and behavior is normal. Thought content normal.   Vitals reviewed.      Assessment/Plan:       Trini was seen today for migraine.    Diagnoses and all orders for this visit:    Chronic migraine without aura without status migrainosus, not intractable    Ms. Obrien is here today to discuss further options.  We had ordered Botox at last visit but unfortunately her co-pay was too high.  She continues to have about 5 migraines per month, for now she is going to add a dose of propranolol in the afternoon as she is currently only taking this twice a day.  If this does help her I will change her prescription accordingly.  She was found to have a significant opacification of her left maxillary sinus with expansion of the sinus on a recent MRI.  She is status post sinus surgery on the right which greatly helped her headaches last year.  She would like to touch base with her ENT, I have gone her appointment for 11/13/2019 with Dr. Ocampo  I have asked her to call me in a couple weeks and let me know how her headaches are doing on 3 times daily propranolol dosing and make sure she is tolerating this well.  If she has any questions or problems prior to that I have asked her to call me at any time.  Reviewed medications, potential side effects and signs and symptoms to report. Discussed risk versus benefits of treatment plan with patient and/or family-including medications, labs and radiology that may be ordered. Addressed questions and concerns during visit. Patient and/or family verbalized understanding and agree with plan.  We reviewed possible headache triggers, stress relief techniques, and alternative treatments for headaches. The patient was in understanding and all questions were addressed. We reviewed the diagnosis and treatment plan and medication side effect profile. The patient will follow up as scheduled.  EMR Dragon/Transcription Disclaimer:  Much of this encounter note is  an electronic transcription of spoken language to printed text. Electronic transcription of spoken language may permit erroneous words or phrases to be inadvertently transcribed. Although I have reviewed the note for such errors, some may still exist in this documentation.           Leanna Pal, APRN  11/5/2019

## 2019-11-06 RX ORDER — PROPRANOLOL HYDROCHLORIDE 20 MG/1
TABLET ORAL
Qty: 540 TABLET | Refills: 0 | Status: SHIPPED | OUTPATIENT
Start: 2019-11-06

## 2019-11-06 NOTE — TELEPHONE ENCOUNTER
Add extra 20 mg afternoon, and if needed and BP stable after a week or so may add 40 mg in afternoon  Please call in prescription as 20 mg 2 PO TID

## 2019-11-18 DIAGNOSIS — G43.709 CHRONIC MIGRAINE WITHOUT AURA WITHOUT STATUS MIGRAINOSUS, NOT INTRACTABLE: ICD-10-CM

## 2020-09-22 ENCOUNTER — TRANSCRIBE ORDERS (OUTPATIENT)
Dept: ADMINISTRATIVE | Facility: HOSPITAL | Age: 60
End: 2020-09-22

## 2020-09-22 DIAGNOSIS — M54.14 THORACIC RADICULOPATHY: Primary | ICD-10-CM

## 2020-09-22 DIAGNOSIS — M54.16 LUMBAR RADICULOPATHY: Primary | ICD-10-CM

## 2020-09-29 ENCOUNTER — APPOINTMENT (OUTPATIENT)
Dept: CT IMAGING | Facility: HOSPITAL | Age: 60
End: 2020-09-29

## 2020-10-19 ENCOUNTER — APPOINTMENT (OUTPATIENT)
Dept: PREADMISSION TESTING | Facility: HOSPITAL | Age: 60
End: 2020-10-19

## 2020-10-19 PROCEDURE — U0004 COV-19 TEST NON-CDC HGH THRU: HCPCS

## 2020-10-19 PROCEDURE — C9803 HOPD COVID-19 SPEC COLLECT: HCPCS

## 2020-10-20 LAB — SARS-COV-2 RNA RESP QL NAA+PROBE: NOT DETECTED

## 2022-02-18 NOTE — PROGRESS NOTES
Subjective:     Patient ID: Trini Obrien is a 59 y.o. female.    CC:   Chief Complaint   Patient presents with   • Migraine       HPI:   History of Present Illness     Ms Obrien is here today for follow up.  Seeing her for her migraine headaches.  When she first came to me she reported a long history of migraines, at that time she was on Topamax 200 mg.  Her migraines had intensified in the months prior to seeing me.  She had also been put on short acting propranolol 20 mg once a day by PCP prior to coming to see me.  During workup MRI of the brain and MRA ordered, she did Dr. salinas due to some stenosis noted on MRI which she thought was insignificant.  She did not move through with MRI.  She reports to me that her headaches did improve after she had sinus surgery.  At last visit she was a bit hypertensive so I increased her propranolol to a more effective dose at 20 mg 3 times daily for both blood pressure and headache prevention.  She states that she has decreased her Topamax to 100 mg at this point and is doing well, she would like to come off this medication.  She did noticed memory improvement with decreased dosage.   She has had no migraines since she was here last but has occasional normal headache.  She has no new neurological symptoms to report.    The following portions of the patient's history were reviewed and updated as appropriate: allergies, current medications, past family history, past medical history, past social history, past surgical history and problem list.    Past Medical History:   Diagnosis Date   • Anxiety    • Arthritis    • Bipolar disorder (CMS/HCC)    • HLD (hyperlipidemia) 2019   • Hypertension    • Migraine        Past Surgical History:   Procedure Laterality Date   • BACK SURGERY   and    • BILATERAL BREAST REDUCTION     •  SECTION     • HYSTERECTOMY     • KNEE SURGERY   and 2017       Social History     Socioeconomic History   • Marital status:       Spouse name: Not on file   • Number of children: Not on file   • Years of education: Not on file   • Highest education level: Not on file   Tobacco Use   • Smoking status: Never Smoker   • Smokeless tobacco: Never Used   Substance and Sexual Activity   • Alcohol use: No     Frequency: Never   • Drug use: No   • Sexual activity: Defer       Family History   Problem Relation Age of Onset   • Diabetes Mother    • Hypertension Mother    • Heart disease Maternal Grandfather         Review of Systems   Constitutional: Negative.    Eyes: Negative.    Respiratory: Negative.    Cardiovascular: Negative.    Gastrointestinal: Negative.    Endocrine: Negative.    Genitourinary: Negative.    Musculoskeletal: Negative.    Skin: Negative.    Allergic/Immunologic: Negative.    Neurological: Positive for headaches (much improved). Negative for dizziness, tremors, seizures, syncope, facial asymmetry, speech difficulty, weakness, light-headedness and numbness.   Hematological: Negative.    Psychiatric/Behavioral: Negative.         Objective:    Neurologic Exam     Mental Status   Oriented to person, place, and time.   Attention: normal. Concentration: normal.   Speech: speech is normal   Level of consciousness: alert  Knowledge: consistent with education.   Able to read. Able to write. Normal comprehension.     Cranial Nerves   Cranial nerves II through XII intact.     CN II   Visual fields full to confrontation.   Right visual field deficit: none  Left visual field deficit: none     CN III, IV, VI   Pupils are equal, round, and reactive to light.  Extraocular motions are normal.   Right pupil: Shape: regular. Reactivity: brisk. Consensual response: intact. Accommodation: intact.   Left pupil: Shape: regular. Reactivity: brisk. Consensual response: intact. Accommodation: intact.   CN III: no CN III palsy  CN VI: no CN VI palsy  Nystagmus: none   Upgaze: normal  Downgaze: normal    CN V   Facial sensation intact.     CN VII   Facial  expression full, symmetric.     CN VIII   CN VIII normal.     CN IX, X   CN IX normal.   CN X normal.     CN XI   CN XI normal.     CN XII   CN XII normal.     Motor Exam   Muscle bulk: normal  Overall muscle tone: normal  Right arm pronator drift: absent  Left arm pronator drift: absent    Strength   Strength 5/5 throughout.     Sensory Exam   Light touch normal.     Gait, Coordination, and Reflexes     Gait  Gait: normal    Coordination   Finger to nose coordination: normal    Tremor   Resting tremor: absent  Intention tremor: absent  Action tremor: absent    Reflexes   Reflexes 2+ except as noted.   Right Anguiano: absent  Left Anguiano: absent      Physical Exam   Constitutional: She is oriented to person, place, and time. She appears well-developed and well-nourished. No distress.   HENT:   Head: Normocephalic and atraumatic.   Eyes: Conjunctivae and EOM are normal. Pupils are equal, round, and reactive to light. No scleral icterus.   Neck: Neck supple.   Pulmonary/Chest: Effort normal. No respiratory distress.   Neurological: She is alert and oriented to person, place, and time. She has normal strength. She has a normal Finger-Nose-Finger Test. Gait normal.   Skin: Skin is warm.   Psychiatric: She has a normal mood and affect. Her speech is normal and behavior is normal. Judgment and thought content normal.   Vitals reviewed.      Assessment/Plan:       Trini was seen today for migraine.    Diagnoses and all orders for this visit:    Chronic migraine without aura without status migrainosus, not intractable  Comments:  improved  cut topamax to 50 mg daily for now    Ms. Obrien is doing well, she has occasional headaches however she has had no migraines in quite some time.  She is currently on gabapentin 200 mg twice daily, she has cut her Topamax to 100 mg daily.  She is also taking propranolol 20 mg 3 times daily, she is using this for both blood pressure and headache control.  She would like to come off the  Topamax, for now would like her to cut this in half and to 50 mg daily.  After 1 month she can cut down to 25 mg daily and then stop after 2 weeks if she would like.  She is doing very well otherwise and has no other complaints today.  I will see her back in 6 months or sooner if needed.  If she has any concerns or problems prior to follow-up appointment she will notify my office ASAP.  Reviewed medications, potential side effects and signs and symptoms to report. Discussed risk versus benefits of treatment plan with patient and/or family-including medications, labs and radiology that may be ordered. Addressed questions and concerns during visit. Patient and/or family verbalized understanding and agree with plan.  We reviewed possible headache triggers, stress relief techniques, and alternative treatments for headaches. The patient was in understanding and all questions were addressed. We reviewed the diagnosis and treatment plan and medication side effect profile. The patient will follow up as scheduled.  EMR Dragon/Transcription Disclaimer:  Much of this encounter note is an electronic transcription of spoken language to printed text. Electronic transcription of spoken language may permit erroneous words or phrases to be inadvertently transcribed. Although I have reviewed the note for such errors, some may still exist in this documentation.           Leanna Pal, APRN  3/27/2019         Adult

## 2023-01-02 NOTE — TELEPHONE ENCOUNTER
According to note we were increasing to 20 mg BID to TID.  If she is taking 2 am and 1 in PM you can refill accordingly if BP is not low.   Odomzo Pregnancy And Lactation Text: This medication is Pregnancy Category X and is absolutely contraindicated during pregnancy. It is unknown if it is excreted in breast milk.